# Patient Record
Sex: MALE | Race: OTHER
[De-identification: names, ages, dates, MRNs, and addresses within clinical notes are randomized per-mention and may not be internally consistent; named-entity substitution may affect disease eponyms.]

---

## 2018-03-30 ENCOUNTER — HOSPITAL ENCOUNTER (EMERGENCY)
Dept: HOSPITAL 54 - ER | Age: 58
Discharge: PSYCHIATRIC OR SUBSTANCE USE FACILITY | End: 2018-03-30
Payer: MEDICAID

## 2018-03-30 VITALS — WEIGHT: 165 LBS | BODY MASS INDEX: 25.01 KG/M2 | HEIGHT: 68 IN

## 2018-03-30 VITALS — DIASTOLIC BLOOD PRESSURE: 64 MMHG | SYSTOLIC BLOOD PRESSURE: 112 MMHG

## 2018-03-30 DIAGNOSIS — F20.9: ICD-10-CM

## 2018-03-30 DIAGNOSIS — F31.9: ICD-10-CM

## 2018-03-30 DIAGNOSIS — F29: Primary | ICD-10-CM

## 2018-03-30 LAB
ALBUMIN SERPL BCP-MCNC: 3.7 G/DL (ref 3.4–5)
ALP SERPL-CCNC: 59 U/L (ref 46–116)
ALT SERPL W P-5'-P-CCNC: 20 U/L (ref 12–78)
APAP SERPL-MCNC: 0 UG/ML (ref 10–30)
APPEARANCE UR: CLEAR
AST SERPL W P-5'-P-CCNC: 41 U/L (ref 15–37)
BASOPHILS # BLD AUTO: 0 /CMM (ref 0–0.2)
BASOPHILS NFR BLD AUTO: 0 % (ref 0–2)
BILIRUB DIRECT SERPL-MCNC: 0.1 MG/DL (ref 0–0.2)
BILIRUB SERPL-MCNC: 0.7 MG/DL (ref 0.2–1)
BILIRUB UR QL STRIP: (no result)
BUN SERPL-MCNC: 20 MG/DL (ref 7–18)
CALCIUM SERPL-MCNC: 9.2 MG/DL (ref 8.5–10.1)
CHLORIDE SERPL-SCNC: 104 MMOL/L (ref 98–107)
CO2 SERPL-SCNC: 26 MMOL/L (ref 21–32)
COLOR UR: YELLOW
CREAT SERPL-MCNC: 0.3 MG/DL (ref 0.6–1.3)
EOSINOPHIL NFR BLD AUTO: 0 % (ref 0–6)
ETHANOL SERPL-MCNC: < 3 MG/DL (ref 0–0)
GLUCOSE SERPL-MCNC: 98 MG/DL (ref 74–106)
GLUCOSE UR STRIP-MCNC: NEGATIVE MG/DL
HCT VFR BLD AUTO: 43 % (ref 39–51)
HGB BLD-MCNC: 14.8 G/DL (ref 13.5–17.5)
HGB UR QL STRIP: NEGATIVE ERY/UL
KETONES UR STRIP-MCNC: (no result) MG/DL
LEUKOCYTE ESTERASE UR QL STRIP: NEGATIVE
LYMPHOCYTES NFR BLD AUTO: 0.8 /CMM (ref 0.8–4.8)
LYMPHOCYTES NFR BLD AUTO: 4.9 % (ref 20–44)
MCHC RBC AUTO-ENTMCNC: 35 G/DL (ref 31–36)
MCV RBC AUTO: 93 FL (ref 80–96)
MONOCYTES NFR BLD AUTO: 1.1 /CMM (ref 0.1–1.3)
MONOCYTES NFR BLD AUTO: 6.7 % (ref 2–12)
NEUTROPHILS # BLD AUTO: 14.6 /CMM (ref 1.8–8.9)
NEUTROPHILS NFR BLD AUTO: 88.4 % (ref 43–81)
NITRITE UR QL STRIP: NEGATIVE
PH UR STRIP: 6 [PH] (ref 5–8)
PLATELET # BLD AUTO: 204 /CMM (ref 150–450)
POTASSIUM SERPL-SCNC: 4.2 MMOL/L (ref 3.5–5.1)
PROT SERPL-MCNC: 7.5 G/DL (ref 6.4–8.2)
PROT UR QL STRIP: (no result) MG/DL
RBC # BLD AUTO: 4.61 MIL/UL (ref 4.5–6)
RBC #/AREA URNS HPF: (no result) /HPF (ref 0–2)
RDW COEFFICIENT OF VARIATION: 13 (ref 11.5–15)
SALICYLATES SERPL-MCNC: 0 MG/DL (ref 2.8–20)
SODIUM SERPL-SCNC: 140 MMOL/L (ref 136–145)
UROBILINOGEN UR STRIP-MCNC: 1 EU/DL
WBC #/AREA URNS HPF: (no result) /HPF
WBC #/AREA URNS HPF: (no result) /HPF (ref 0–3)
WBC NRBC COR # BLD AUTO: 16.6 K/UL (ref 4.3–11)

## 2018-03-30 PROCEDURE — G0480 DRUG TEST DEF 1-7 CLASSES: HCPCS

## 2018-03-30 PROCEDURE — Z7610: HCPCS

## 2018-03-30 PROCEDURE — A4606 OXYGEN PROBE USED W OXIMETER: HCPCS

## 2018-03-30 NOTE — NUR
[id number]: CALLED BY BROTHER, UNABLE TO WALK, C/O LEG WEAKNESS. PATIENT IS A/OX 
3, BREATHING EVEN AND UNLABORED. NO SOB, NAD, VITALS STABLE. SAFETY AND COMFORT 
MEASURES IN PLACE. AWAITING MD ORDERS.

## 2018-03-30 NOTE — NUR
CALLED THE [facility] [phone], LEFT A MESSAGE TO 
REQUEST INSURANCE INFO ON PT.

## 2018-03-30 NOTE — NUR
PATIENT VERY AGITATED AT THIS TIME, COMBATIVE, AGGRESSIVE.  ATTEMPTED TO CALM 
PATIENT DOWN WITH NO SUCCESS.  MD AT BEDSIDE, ORDERED MEDICATION.

## 2018-05-21 ENCOUNTER — HOSPITAL ENCOUNTER (EMERGENCY)
Dept: HOSPITAL 54 - ER | Age: 58
LOS: 1 days | Discharge: HOME | End: 2018-05-22
Payer: COMMERCIAL

## 2018-05-21 VITALS — HEIGHT: 69 IN | BODY MASS INDEX: 20.44 KG/M2 | WEIGHT: 138 LBS

## 2018-05-21 DIAGNOSIS — F31.9: ICD-10-CM

## 2018-05-21 DIAGNOSIS — F20.9: Primary | ICD-10-CM

## 2018-05-21 DIAGNOSIS — Z60.2: ICD-10-CM

## 2018-05-21 LAB
ALBUMIN SERPL BCP-MCNC: 3.2 G/DL (ref 3.4–5)
ALP SERPL-CCNC: 57 U/L (ref 46–116)
ALT SERPL W P-5'-P-CCNC: 30 U/L (ref 12–78)
APAP SERPL-MCNC: 0 UG/ML (ref 10–30)
AST SERPL W P-5'-P-CCNC: 25 U/L (ref 15–37)
BASOPHILS # BLD AUTO: 0 /CMM (ref 0–0.2)
BASOPHILS NFR BLD AUTO: 0.2 % (ref 0–2)
BILIRUB DIRECT SERPL-MCNC: 0.2 MG/DL (ref 0–0.2)
BILIRUB SERPL-MCNC: 0.7 MG/DL (ref 0.2–1)
BUN SERPL-MCNC: 17 MG/DL (ref 7–18)
CALCIUM SERPL-MCNC: 8.9 MG/DL (ref 8.5–10.1)
CHLORIDE SERPL-SCNC: 101 MMOL/L (ref 98–107)
CO2 SERPL-SCNC: 27 MMOL/L (ref 21–32)
CREAT SERPL-MCNC: 0.7 MG/DL (ref 0.6–1.3)
EOSINOPHIL NFR BLD AUTO: 0.2 % (ref 0–6)
ETHANOL SERPL-MCNC: < 3 MG/DL (ref 0–0)
GLUCOSE SERPL-MCNC: 97 MG/DL (ref 74–106)
HCT VFR BLD AUTO: 39 % (ref 39–51)
HGB BLD-MCNC: 13.3 G/DL (ref 13.5–17.5)
LYMPHOCYTES NFR BLD AUTO: 1.7 /CMM (ref 0.8–4.8)
LYMPHOCYTES NFR BLD AUTO: 19.3 % (ref 20–44)
MCHC RBC AUTO-ENTMCNC: 35 G/DL (ref 31–36)
MCV RBC AUTO: 92 FL (ref 80–96)
MONOCYTES NFR BLD AUTO: 0.8 /CMM (ref 0.1–1.3)
MONOCYTES NFR BLD AUTO: 9.5 % (ref 2–12)
NEUTROPHILS # BLD AUTO: 6.1 /CMM (ref 1.8–8.9)
NEUTROPHILS NFR BLD AUTO: 70.8 % (ref 43–81)
PLATELET # BLD AUTO: 255 /CMM (ref 150–450)
POTASSIUM SERPL-SCNC: 4 MMOL/L (ref 3.5–5.1)
PROT SERPL-MCNC: 6.8 G/DL (ref 6.4–8.2)
RBC # BLD AUTO: 4.18 MIL/UL (ref 4.5–6)
RDW COEFFICIENT OF VARIATION: 12.3 (ref 11.5–15)
SALICYLATES SERPL-MCNC: < 0.2 MG/DL (ref 2.8–20)
SODIUM SERPL-SCNC: 135 MMOL/L (ref 136–145)
WBC NRBC COR # BLD AUTO: 8.6 K/UL (ref 4.3–11)

## 2018-05-21 PROCEDURE — A4606 OXYGEN PROBE USED W OXIMETER: HCPCS

## 2018-05-21 PROCEDURE — Z7610: HCPCS

## 2018-05-21 PROCEDURE — G0480 DRUG TEST DEF 1-7 CLASSES: HCPCS

## 2018-05-21 SDOH — SOCIAL STABILITY - SOCIAL INSECURITY: PROBLEMS RELATED TO LIVING ALONE: Z60.2

## 2018-05-21 NOTE — NUR
PT BIB [facility] AND [facility] [employer] WITH A C/O WANDERING THE STREETS, SCHIZOPHRENIA 
AND NON COMPLIANT WITH MEDICATION. PT IS Hungarian SPEAKING ONLY. PT IS NOT 
TALKING /ANSWERING QUESTIONS. PT ONLY ANSWERED A FEW QUESTIONS WITH [employer]. PT 
WAS PLACED ON THE MONITOR AND CONTINUOUS PULSE OX. PT WAS PLACED ON A 5150 BY 
[employer].

## 2018-05-22 VITALS — SYSTOLIC BLOOD PRESSURE: 119 MMHG | DIASTOLIC BLOOD PRESSURE: 72 MMHG

## 2018-05-22 NOTE — NUR
PT APPEARS TO BE SLEEPING COMFORTABLY WITH NO S/S OF PAIN OR DISTRESS. WILL 
CONTINUE TO MONITOR THE PT.

## 2018-05-22 NOTE — NUR
Patient discharged to family to go home in stable condition. Written and verbal 
after care instructions given. Patient and pt's family members verbalize 
understanding of instructions. VSS upon discharge. Pt being helped to get 
dressed by brother.